# Patient Record
Sex: FEMALE | Race: NATIVE HAWAIIAN OR OTHER PACIFIC ISLANDER | HISPANIC OR LATINO | Employment: OTHER | URBAN - METROPOLITAN AREA
[De-identification: names, ages, dates, MRNs, and addresses within clinical notes are randomized per-mention and may not be internally consistent; named-entity substitution may affect disease eponyms.]

---

## 2024-10-30 ENCOUNTER — OFFICE VISIT (OUTPATIENT)
Dept: FAMILY MEDICINE CLINIC | Facility: CLINIC | Age: 84
End: 2024-10-30
Payer: MEDICARE

## 2024-10-30 VITALS
WEIGHT: 127.4 LBS | SYSTOLIC BLOOD PRESSURE: 136 MMHG | BODY MASS INDEX: 25.68 KG/M2 | TEMPERATURE: 97.9 F | DIASTOLIC BLOOD PRESSURE: 80 MMHG | HEART RATE: 58 BPM | HEIGHT: 59 IN | OXYGEN SATURATION: 97 %

## 2024-10-30 DIAGNOSIS — Z23 NEED FOR INFLUENZA VACCINATION: ICD-10-CM

## 2024-10-30 DIAGNOSIS — Z00.00 ENCOUNTER FOR SUBSEQUENT ANNUAL WELLNESS VISIT (AWV) IN MEDICARE PATIENT: Primary | ICD-10-CM

## 2024-10-30 DIAGNOSIS — M15.9 OSTEOARTHRITIS OF MULTIPLE JOINTS, UNSPECIFIED OSTEOARTHRITIS TYPE: ICD-10-CM

## 2024-10-30 DIAGNOSIS — E66.3 OVERWEIGHT WITH BODY MASS INDEX (BMI) OF 26 TO 26.9 IN ADULT: ICD-10-CM

## 2024-10-30 DIAGNOSIS — Z78.0 POST-MENOPAUSAL: ICD-10-CM

## 2024-10-30 DIAGNOSIS — R73.01 IFG (IMPAIRED FASTING GLUCOSE): ICD-10-CM

## 2024-10-30 DIAGNOSIS — Z13.29 THYROID DISORDER SCREENING: ICD-10-CM

## 2024-10-30 DIAGNOSIS — E55.9 VITAMIN D DEFICIENCY: ICD-10-CM

## 2024-10-30 DIAGNOSIS — E78.2 MIXED HYPERLIPIDEMIA: ICD-10-CM

## 2024-10-30 DIAGNOSIS — Z23 NEED FOR PNEUMOCOCCAL 20-VALENT CONJUGATE VACCINATION: ICD-10-CM

## 2024-10-30 DIAGNOSIS — I10 ESSENTIAL HYPERTENSION: ICD-10-CM

## 2024-10-30 LAB
25(OH)D3 SERPL-MCNC: 95.8 NG/ML (ref 30–100)
ALBUMIN SERPL-MCNC: 4.2 G/DL (ref 3.5–5.2)
ALBUMIN/GLOB SERPL: 1.3 G/DL
ALP SERPL-CCNC: 68 U/L (ref 39–117)
ALT SERPL W P-5'-P-CCNC: 25 U/L (ref 1–33)
ANION GAP SERPL CALCULATED.3IONS-SCNC: 9 MMOL/L (ref 5–15)
AST SERPL-CCNC: 18 U/L (ref 1–32)
BASOPHILS # BLD AUTO: 0.04 10*3/MM3 (ref 0–0.2)
BASOPHILS NFR BLD AUTO: 0.5 % (ref 0–1.5)
BILIRUB SERPL-MCNC: 0.4 MG/DL (ref 0–1.2)
BUN SERPL-MCNC: 18 MG/DL (ref 8–23)
BUN/CREAT SERPL: 15.3 (ref 7–25)
CALCIUM SPEC-SCNC: 10.1 MG/DL (ref 8.6–10.5)
CHLORIDE SERPL-SCNC: 99 MMOL/L (ref 98–107)
CHOLEST SERPL-MCNC: 160 MG/DL (ref 0–200)
CO2 SERPL-SCNC: 28 MMOL/L (ref 22–29)
CREAT SERPL-MCNC: 1.18 MG/DL (ref 0.57–1)
DEPRECATED RDW RBC AUTO: 41.6 FL (ref 37–54)
EGFRCR SERPLBLD CKD-EPI 2021: 45.6 ML/MIN/1.73
EOSINOPHIL # BLD AUTO: 0.18 10*3/MM3 (ref 0–0.4)
EOSINOPHIL NFR BLD AUTO: 2 % (ref 0.3–6.2)
ERYTHROCYTE [DISTWIDTH] IN BLOOD BY AUTOMATED COUNT: 12.7 % (ref 12.3–15.4)
GLOBULIN UR ELPH-MCNC: 3.2 GM/DL
GLUCOSE SERPL-MCNC: 87 MG/DL (ref 65–99)
HBA1C MFR BLD: 5.7 % (ref 4.8–5.6)
HCT VFR BLD AUTO: 38.2 % (ref 34–46.6)
HDLC SERPL-MCNC: 49 MG/DL (ref 40–60)
HGB BLD-MCNC: 12.2 G/DL (ref 12–15.9)
IMM GRANULOCYTES # BLD AUTO: 0.03 10*3/MM3 (ref 0–0.05)
IMM GRANULOCYTES NFR BLD AUTO: 0.3 % (ref 0–0.5)
LDLC SERPL CALC-MCNC: 93 MG/DL (ref 0–100)
LDLC/HDLC SERPL: 1.87 {RATIO}
LYMPHOCYTES # BLD AUTO: 1.62 10*3/MM3 (ref 0.7–3.1)
LYMPHOCYTES NFR BLD AUTO: 18.4 % (ref 19.6–45.3)
MCH RBC QN AUTO: 28 PG (ref 26.6–33)
MCHC RBC AUTO-ENTMCNC: 31.9 G/DL (ref 31.5–35.7)
MCV RBC AUTO: 87.6 FL (ref 79–97)
MONOCYTES # BLD AUTO: 0.89 10*3/MM3 (ref 0.1–0.9)
MONOCYTES NFR BLD AUTO: 10.1 % (ref 5–12)
NEUTROPHILS NFR BLD AUTO: 6.05 10*3/MM3 (ref 1.7–7)
NEUTROPHILS NFR BLD AUTO: 68.7 % (ref 42.7–76)
NRBC BLD AUTO-RTO: 0 /100 WBC (ref 0–0.2)
PLATELET # BLD AUTO: 286 10*3/MM3 (ref 140–450)
PMV BLD AUTO: 10.6 FL (ref 6–12)
POTASSIUM SERPL-SCNC: 4.3 MMOL/L (ref 3.5–5.2)
PROT SERPL-MCNC: 7.4 G/DL (ref 6–8.5)
RBC # BLD AUTO: 4.36 10*6/MM3 (ref 3.77–5.28)
SODIUM SERPL-SCNC: 136 MMOL/L (ref 136–145)
TRIGL SERPL-MCNC: 97 MG/DL (ref 0–150)
TSH SERPL DL<=0.05 MIU/L-ACNC: 2.37 UIU/ML (ref 0.27–4.2)
VLDLC SERPL-MCNC: 18 MG/DL (ref 5–40)
WBC NRBC COR # BLD AUTO: 8.81 10*3/MM3 (ref 3.4–10.8)

## 2024-10-30 PROCEDURE — 1126F AMNT PAIN NOTED NONE PRSNT: CPT | Performed by: NURSE PRACTITIONER

## 2024-10-30 PROCEDURE — 36415 COLL VENOUS BLD VENIPUNCTURE: CPT | Performed by: NURSE PRACTITIONER

## 2024-10-30 PROCEDURE — 80053 COMPREHEN METABOLIC PANEL: CPT | Performed by: NURSE PRACTITIONER

## 2024-10-30 PROCEDURE — G0008 ADMIN INFLUENZA VIRUS VAC: HCPCS | Performed by: NURSE PRACTITIONER

## 2024-10-30 PROCEDURE — G0009 ADMIN PNEUMOCOCCAL VACCINE: HCPCS | Performed by: NURSE PRACTITIONER

## 2024-10-30 PROCEDURE — 85025 COMPLETE CBC W/AUTO DIFF WBC: CPT | Performed by: NURSE PRACTITIONER

## 2024-10-30 PROCEDURE — 83036 HEMOGLOBIN GLYCOSYLATED A1C: CPT | Performed by: NURSE PRACTITIONER

## 2024-10-30 PROCEDURE — 1170F FXNL STATUS ASSESSED: CPT | Performed by: NURSE PRACTITIONER

## 2024-10-30 PROCEDURE — 1159F MED LIST DOCD IN RCRD: CPT | Performed by: NURSE PRACTITIONER

## 2024-10-30 PROCEDURE — G0439 PPPS, SUBSEQ VISIT: HCPCS | Performed by: NURSE PRACTITIONER

## 2024-10-30 PROCEDURE — 90677 PCV20 VACCINE IM: CPT | Performed by: NURSE PRACTITIONER

## 2024-10-30 PROCEDURE — 96160 PT-FOCUSED HLTH RISK ASSMT: CPT | Performed by: NURSE PRACTITIONER

## 2024-10-30 PROCEDURE — 90662 IIV NO PRSV INCREASED AG IM: CPT | Performed by: NURSE PRACTITIONER

## 2024-10-30 PROCEDURE — 82306 VITAMIN D 25 HYDROXY: CPT | Performed by: NURSE PRACTITIONER

## 2024-10-30 PROCEDURE — 80061 LIPID PANEL: CPT | Performed by: NURSE PRACTITIONER

## 2024-10-30 PROCEDURE — 84443 ASSAY THYROID STIM HORMONE: CPT | Performed by: NURSE PRACTITIONER

## 2024-10-30 PROCEDURE — 99214 OFFICE O/P EST MOD 30 MIN: CPT | Performed by: NURSE PRACTITIONER

## 2024-10-30 PROCEDURE — 1160F RVW MEDS BY RX/DR IN RCRD: CPT | Performed by: NURSE PRACTITIONER

## 2024-10-30 RX ORDER — HYDROCHLOROTHIAZIDE 50 MG/1
50 TABLET ORAL DAILY
Qty: 90 TABLET | Refills: 1 | Status: SHIPPED | OUTPATIENT
Start: 2024-10-30

## 2024-10-30 RX ORDER — HYDROCHLOROTHIAZIDE 50 MG/1
TABLET ORAL
COMMUNITY
Start: 2024-09-05 | End: 2024-10-30 | Stop reason: SDUPTHER

## 2024-10-30 RX ORDER — SIMVASTATIN 10 MG
10 TABLET ORAL NIGHTLY
Qty: 90 TABLET | Refills: 1 | Status: SHIPPED | OUTPATIENT
Start: 2024-10-30

## 2024-10-30 RX ORDER — PHENOL 1.4 %
600 AEROSOL, SPRAY (ML) MUCOUS MEMBRANE DAILY
COMMUNITY

## 2024-10-30 RX ORDER — MULTIVIT WITH MINERALS/LUTEIN
1000 TABLET ORAL DAILY
COMMUNITY

## 2024-10-30 RX ORDER — METOPROLOL TARTRATE 50 MG
50 TABLET ORAL 2 TIMES DAILY
Qty: 180 TABLET | Refills: 1 | Status: SHIPPED | OUTPATIENT
Start: 2024-10-30

## 2024-10-30 RX ORDER — LOSARTAN POTASSIUM 100 MG/1
100 TABLET ORAL DAILY
Qty: 90 TABLET | Refills: 1 | Status: SHIPPED | OUTPATIENT
Start: 2024-10-30

## 2024-10-30 RX ORDER — METOPROLOL TARTRATE 50 MG
TABLET ORAL
COMMUNITY
Start: 2024-10-04 | End: 2024-10-30 | Stop reason: SDUPTHER

## 2024-10-30 RX ORDER — MULTIVIT-MINERALS/FOLIC ACID 80 MCG
TABLET,CHEWABLE ORAL
COMMUNITY

## 2024-10-30 RX ORDER — SIMVASTATIN 10 MG
TABLET ORAL
COMMUNITY
Start: 2024-09-27 | End: 2024-10-30 | Stop reason: SDUPTHER

## 2024-10-30 RX ORDER — ACETAMINOPHEN 500 MG
500 TABLET ORAL EVERY 6 HOURS PRN
COMMUNITY

## 2024-10-30 RX ORDER — LOSARTAN POTASSIUM 100 MG/1
TABLET ORAL
COMMUNITY
Start: 2024-08-29 | End: 2024-10-30 | Stop reason: SDUPTHER

## 2024-10-30 RX ORDER — BIOTIN 1000 MCG
TABLET,CHEWABLE ORAL
COMMUNITY

## 2024-10-30 RX ORDER — NAPROXEN 500 MG/1
500 TABLET ORAL 2 TIMES DAILY WITH MEALS
COMMUNITY

## 2024-10-30 RX ORDER — AMOXICILLIN 500 MG
1200 CAPSULE ORAL DAILY
COMMUNITY

## 2024-10-30 RX ORDER — MAGNESIUM OXIDE 400 MG/1
400 TABLET ORAL DAILY
COMMUNITY

## 2024-10-30 NOTE — PROGRESS NOTES
Subjective   The ABCs of the Annual Wellness Visit  Medicare Wellness Visit      Jacquelin Mckenna is a 84 y.o. patient who presents for a Medicare Wellness Visit.    The following portions of the patient's history were reviewed and   updated as appropriate: allergies, current medications, past family history, past medical history, past social history, past surgical history, and problem list.    Compared to one year ago, the patient's physical   health is the same.  Compared to one year ago, the patient's mental   health is the same.    Recent Hospitalizations:  She was not admitted to the hospital during the last year.     Current Medical Providers:  Patient Care Team:  Neva Bernal APRN as PCP - General (Nurse Practitioner)    Outpatient Medications Prior to Visit   Medication Sig Dispense Refill    acetaminophen (TYLENOL) 500 MG tablet Take 1 tablet by mouth Every 6 (Six) Hours As Needed for Mild Pain.      Biotin 1000 MCG chewable tablet Chew.      calcium carbonate (OS-QUINTEN) 600 MG tablet Take 1 tablet by mouth Daily.      cholecalciferol (VITAMIN D3) 1.25 MG (75232 UT) capsule Take 1 capsule by mouth Every 7 (Seven) Days.      magnesium oxide (MAG-OX) 400 MG tablet Take 1 tablet by mouth Daily.      Multiple Vitamins-Minerals (Centrum Adult 50+ MultiGummies) chewable tablet Chew.      naproxen (NAPROSYN) 500 MG tablet Take 1 tablet by mouth 2 (Two) Times a Day With Meals.      Omega-3 Fatty Acids (fish oil) 1200 MG capsule capsule Take 1 capsule by mouth Daily.      VITAMIN E 1000 UNIT capsule Take 1 capsule by mouth Daily.      hydroCHLOROthiazide 50 MG tablet       losartan (COZAAR) 100 MG tablet       metoprolol tartrate (LOPRESSOR) 50 MG tablet       omeprazole (priLOSEC) 20 MG capsule       simvastatin (ZOCOR) 10 MG tablet        No facility-administered medications prior to visit.     No opioid medication identified on active medication list. I have reviewed chart for other potential  high risk  "medication/s and harmful drug interactions in the elderly.      Aspirin is not on active medication list.  Aspirin use is not indicated based on review of current medical condition/s. Risk of harm outweighs potential benefits.  .    There is no problem list on file for this patient.    Advance Care Planning Advance Directive is not on file.  ACP discussion was held with the patient during this visit. Patient does not have an advance directive, information provided.            Objective   Vitals:    10/30/24 0904   BP: 136/80   Pulse: 58   Temp: 97.9 °F (36.6 °C)   SpO2: 97%   Weight: 57.8 kg (127 lb 6.4 oz)   Height: 148.6 cm (58.5\")   PainSc: 0-No pain       Estimated body mass index is 26.17 kg/m² as calculated from the following:    Height as of this encounter: 148.6 cm (58.5\").    Weight as of this encounter: 57.8 kg (127 lb 6.4 oz).    BMI is >= 25 and <30. (Overweight) The following options were offered after discussion;: weight loss educational material (shared in after visit summary)       Does the patient have evidence of cognitive impairment? No                                                                                                Health  Risk Assessment    Smoking Status:  Social History     Tobacco Use   Smoking Status Never   Smokeless Tobacco Never     Alcohol Consumption:  Social History     Substance and Sexual Activity   Alcohol Use Never       Fall Risk Screen  STEADI Fall Risk Assessment was completed, and patient is at LOW risk for falls.Assessment completed on:10/30/2024    Depression Screening:      10/30/2024     9:13 AM   PHQ-2/PHQ-9 Depression Screening   Little interest or pleasure in doing things Not at all   Feeling down, depressed, or hopeless Not at all     Health Habits and Functional and Cognitive Screening:      10/30/2024     9:00 AM   Functional & Cognitive Status   Do you have difficulty preparing food and eating? No   Do you have difficulty bathing yourself, getting " dressed or grooming yourself? No   Do you have difficulty using the toilet? No   Do you have difficulty moving around from place to place? No   Do you have trouble with steps or getting out of a bed or a chair? No   Current Diet Well Balanced Diet   Dental Exam Up to date   Eye Exam Unknown   Exercise (times per week) 1 times per week   Do you need help using the phone?  No   Are you deaf or do you have serious difficulty hearing?  No   Do you need help to go to places out of walking distance? No   Do you need help shopping? Yes   Do you need help preparing meals?  Yes   Do you need help with housework?  Yes   Do you need help with laundry? Yes   Do you need help taking your medications? No   Do you need help managing money? No   Do you ever drive or ride in a car without wearing a seat belt? No   Have you felt unusual stress, anger or loneliness in the last month? No   Who do you live with? Child   If you need help, do you have trouble finding someone available to you? No   Have you been bothered in the last four weeks by sexual problems? No   Do you have difficulty concentrating, remembering or making decisions? No           Age-appropriate Screening Schedule:  Refer to the list below for future screening recommendations based on patient's age, sex and/or medical conditions. Orders for these recommended tests are listed in the plan section. The patient has been provided with a written plan.    Health Maintenance List  Health Maintenance   Topic Date Due    LIPID PANEL  Never done    DXA SCAN  Never done    BMI FOLLOWUP  Never done    ZOSTER VACCINE (1 of 2) 10/30/2024 (Originally [birth year]1990)    COVID-19 Vaccine (1 - 2023-24 season) 01/19/2025 (Originally 9/1/2024)    RSV Vaccine - Adults (1 - 1-dose 75+ series) 10/30/2025 (Originally 5/7/2015)    TDAP/TD VACCINES (1 - Tdap) 10/30/2025 (Originally 5/7/1959)    ANNUAL WELLNESS VISIT  10/30/2025    INFLUENZA VACCINE  Completed    Pneumococcal Vaccine 65+  Completed                                                                                                                                                 CMS Preventative Services Quick Reference  Risk Factors Identified During Encounter  None Identified    The above risks/problems have been discussed with the patient.  Pertinent information has been shared with the patient in the After Visit Summary.  An After Visit Summary and PPPS were made available to the patient.    Follow Up:   Next Medicare Wellness visit to be scheduled in 1 year.          Additional E&M Note during same encounter follows:  Patient has multiple medical problems which are significant and separately identifiable that require additional work above and beyond the Medicare Wellness Visit.      Chief Complaint  Establish Care    History of Present Illness  The patient presents to establish care. She is accompanied by an adult female she comes in today as a new patient recently moved to the area from [place]..    She reports no significant health concerns at this time. Her current medications include metoprolol, losartan, and hydrochlorothiazide for blood pressure, simvastatin for hyperlipidemia, and over-the-counter fish oil and vitamins. She also takes omeprazole. She has not had any recent lab work done and has not been hospitalized recently. She does not have an advanced directive or living will.    She experiences occasional forgetfulness, which she attributes to aging. She had a fall last year resulting in a bruise on her head but did not lose consciousness. She is independent in her daily activities, including cooking, cleaning, and personal hygiene. She occasionally uses a cane for support when walking outdoors. Her diet is generally well-balanced. She has not had an eye exam recently and reports no hearing issues. She no longer drives and uses an alarm to remind her to take her medications.    She experiences occasional knee pain, for which she  "takes medication as needed. She has tried over-the-counter arthritis cream with some relief. She has had shingles twice but has not received the vaccine. She does not experience any chronic back pain.    FAMILY HISTORY  Her grandmother had diabetes.      Jacquelin Mckenna is a 84 y.o. female who presents to [facility]       Objective   Vital Signs:   Vitals:    10/30/24 0904   BP: 136/80   Pulse: 58   Temp: 97.9 °F (36.6 °C)   SpO2: 97%   Weight: 57.8 kg (127 lb 6.4 oz)   Height: 148.6 cm (58.5\")   PainSc: 0-No pain       Wt Readings from Last 3 Encounters:   10/30/24 57.8 kg (127 lb 6.4 oz)     BP Readings from Last 3 Encounters:   10/30/24 136/80     BMI is >= 25 and <30. (Overweight) The following options were offered after discussion;: weight loss educational material (shared in after visit summary)   Physical Exam  Vitals reviewed.   Constitutional:       General: She is not in acute distress.     Appearance: Normal appearance. She is well-developed. She is not ill-appearing.      Comments: Overweight BMI 26   HENT:      Head: Normocephalic and atraumatic.   Eyes:      Conjunctiva/sclera: Conjunctivae normal.      Pupils: Pupils are equal, round, and reactive to light.   Cardiovascular:      Rate and Rhythm: Normal rate and regular rhythm.      Heart sounds: No murmur heard.  Pulmonary:      Effort: Pulmonary effort is normal.      Breath sounds: Normal breath sounds. No wheezing.   Musculoskeletal:      Right lower leg: No edema.      Left lower leg: No edema.   Skin:     General: Skin is warm and dry.   Neurological:      Mental Status: She is alert and oriented to person, place, and time.   Psychiatric:         Mood and Affect: Mood and affect normal.         Behavior: Behavior normal.         Thought Content: Thought content normal.         Judgment: Judgment normal.         The following data was reviewed by CORDELIA Davis on 10/30/2024  Common Labs "       Assessment & Plan   Diagnoses and all orders for this visit:    1. Encounter for subsequent annual wellness visit (AWV) in Medicare patient (Primary)    2. Vitamin D deficiency  -     Vitamin D,25-Hydroxy    3. Mixed hyperlipidemia  -     CBC & Differential  -     Comprehensive Metabolic Panel  -     Lipid Panel    4. Essential hypertension  -     CBC & Differential  -     Comprehensive Metabolic Panel    5. Need for influenza vaccination    6. Need for pneumococcal 20-valent conjugate vaccination    7. Overweight with body mass index (BMI) of 26 to 26.9 in adult    8. Post-menopausal  -     DEXA Bone Density Axial; Future    9. Thyroid disorder screening  -     TSH    10. Osteoarthritis of multiple joints, unspecified osteoarthritis type    11. IFG (impaired fasting glucose)  -     Hemoglobin A1c    Other orders  -     Fluzone High-Dose 65+yrs (4169-8146)  -     Pneumococcal Conjugate Vaccine 20-Valent (PCV20)  -     hydroCHLOROthiazide 50 MG tablet; Take 1 tablet by mouth Daily.  Dispense: 90 tablet; Refill: 1  -     losartan (COZAAR) 100 MG tablet; Take 1 tablet by mouth Daily.  Dispense: 90 tablet; Refill: 1  -     metoprolol tartrate (LOPRESSOR) 50 MG tablet; Take 1 tablet by mouth 2 (Two) Times a Day.  Dispense: 180 tablet; Refill: 1  -     omeprazole (priLOSEC) 20 MG capsule; Take 1 capsule by mouth Daily.  Dispense: 90 capsule; Refill: 1  -     simvastatin (ZOCOR) 10 MG tablet; Take 1 tablet by mouth Every Night.  Dispense: 90 tablet; Refill: 1  -     Diclofenac Sodium (VOLTAREN) 1 % gel gel; Apply 4 g topically to the appropriate area as directed 4 (Four) Times a Day As Needed (joint pain).  Dispense: 100 g; Refill: 3        Assessment & Plan  1. Hypertension.  Blood pressure readings are within the normal range. Continue current blood pressure medications. Prescriptions were renewed and sent to [facility] pharmacy.    2. Hyperlipidemia.  Prescriptions for cholesterol medications were renewed and  sent to [facility] pharmacy.    3. Gastroesophageal Reflux Disease (GERD).  Prescription for omeprazole was renewed and sent to [facility] pharmacy.    4. Osteoporosis Screening.  A DEXA scan was ordered to screen for bone loss, as it has been a long time since the last screening.  Will order diclofenac gel to help with joint pain to use as needed.    5. Arthritis.  Arthritis cream was prescribed for application as needed, up to 3 to 4 times daily. She was advised to use a cane for outdoor mobility to prevent falls.    6. Health Maintenance.  Pneumonia and influenza vaccines were administered during this visit. She was advised to receive the shingles vaccine at a pharmacy in a month. Blood work was ordered and she will be notified of the results.    Follow-up  Return in 6 months for follow up, or sooner if necessary.      BMI is >= 25 and <30. (Overweight) The following options were offered after discussion;: weight loss educational material (shared in after visit summary)       FOLLOW UP  Return in about 6 months (around 4/30/2025) for Recheck.  Patient was given instructions and counseling regarding her condition or for health maintenance advice. Please see specific information pulled into the AVS if appropriate.     CORDELIA Davis  10/30/24  11:54 EDT    Patient or patient representative verbalized consent for the use of Ambient Listening during the visit with  CORDELIA Davis for chart documentation. 10/30/2024  09:28 EDT

## 2024-11-06 ENCOUNTER — HOSPITAL ENCOUNTER (EMERGENCY)
Facility: HOSPITAL | Age: 84
Discharge: HOME | End: 2024-11-06
Attending: EMERGENCY MEDICINE | Admitting: EMERGENCY MEDICINE
Payer: MEDICARE

## 2024-11-06 ENCOUNTER — APPOINTMENT (OUTPATIENT)
Dept: CT IMAGING | Facility: HOSPITAL | Age: 84
End: 2024-11-06
Payer: MEDICARE

## 2024-11-06 VITALS
WEIGHT: 128.3 LBS | DIASTOLIC BLOOD PRESSURE: 60 MMHG | OXYGEN SATURATION: 100 % | BODY MASS INDEX: 25.87 KG/M2 | HEIGHT: 59 IN | RESPIRATION RATE: 20 BRPM | TEMPERATURE: 98.3 F | HEART RATE: 59 BPM | SYSTOLIC BLOOD PRESSURE: 169 MMHG

## 2024-11-06 DIAGNOSIS — S00.03XA HEMATOMA OF SCALP, INITIAL ENCOUNTER: Primary | ICD-10-CM

## 2024-11-06 PROCEDURE — 70450 CT HEAD/BRAIN W/O DYE: CPT

## 2024-11-06 PROCEDURE — 99284 EMERGENCY DEPT VISIT MOD MDM: CPT

## 2024-11-06 RX ORDER — TRANEXAMIC ACID 100 MG/ML
500 INJECTION, SOLUTION INTRAVENOUS ONCE
Status: COMPLETED | OUTPATIENT
Start: 2024-11-06 | End: 2024-11-06

## 2024-11-06 RX ADMIN — TRANEXAMIC ACID 500 MG: 100 INJECTION, SOLUTION INTRAVENOUS at 14:17

## 2024-11-06 NOTE — DISCHARGE INSTRUCTIONS
Leave bandage over the hematoma today.  You may have swelling in this area for 2 to 3 weeks until the hematoma absorbs.  Return for new or worsening symptoms.

## 2024-11-06 NOTE — ED PROVIDER NOTES
Time: 1:09 PM EST  Date of encounter:  11/6/2024  Independent Historian/Clinical History and Information was obtained by:   Patient and Family    History is limited by: N/A    Chief Complaint: Fall      History of Present Illness:  Patient is a 84 y.o. year old female who presents to the emergency department for evaluation of fall.  Patient presents to the emergency department today after a fall.  Patient was helping her daughter move boxes when she accidentally fell backwards and impacted her head on the concrete.  Patient denies any dizziness or weakness prior to the fall.  Patient do not have any syncope.  Patient did state after the fall she had some dizziness but that has resolved.  Patient denies any pain other than the posterior side of her head where she has swelling.  Patient was seen urgent care and sent here for further evaluation.  Patient is not on any blood thinners.      Patient Care Team  Primary Care Provider: Neva Bernal APRN    Past Medical History:     No Known Allergies  Past Medical History:   Diagnosis Date    Arthritis     GERD (gastroesophageal reflux disease)     Hyperlipidemia     Hypertension      Past Surgical History:   Procedure Laterality Date    BREAST SURGERY Left 02/27/2004     History reviewed. No pertinent family history.    Home Medications:  Prior to Admission medications    Medication Sig Start Date End Date Taking? Authorizing Provider   acetaminophen (TYLENOL) 500 MG tablet Take 1 tablet by mouth Every 6 (Six) Hours As Needed for Mild Pain.    ProviderPritesh MD   Biotin 1000 MCG chewable tablet Chew.    Pritesh Winters MD   calcium carbonate (OS-QUINTEN) 600 MG tablet Take 1 tablet by mouth Daily.    Pritesh Winters MD   cholecalciferol (VITAMIN D3) 1.25 MG (14578 UT) capsule Take 1 capsule by mouth Every 7 (Seven) Days.    Pritesh Winters MD   Diclofenac Sodium (VOLTAREN) 1 % gel gel Apply 4 g topically to the appropriate area as directed 4  "(Four) Times a Day As Needed (joint pain). 10/30/24   Neva Bernal APRN   hydroCHLOROthiazide 50 MG tablet Take 1 tablet by mouth Daily. 10/30/24   Neva Bernal APRN   losartan (COZAAR) 100 MG tablet Take 1 tablet by mouth Daily. 10/30/24   Neva Bernal APRN   magnesium oxide (MAG-OX) 400 MG tablet Take 1 tablet by mouth Daily.    Pritesh Winters MD   metoprolol tartrate (LOPRESSOR) 50 MG tablet Take 1 tablet by mouth 2 (Two) Times a Day. 10/30/24   Neva Bernal APRN   Multiple Vitamins-Minerals (Centrum Adult 50+ MultiGummies) chewable tablet Chew.    Pritesh Winters MD   naproxen (NAPROSYN) 500 MG tablet Take 1 tablet by mouth 2 (Two) Times a Day With Meals.    Pritesh Winters MD   Omega-3 Fatty Acids (fish oil) 1200 MG capsule capsule Take 1 capsule by mouth Daily.    Pritesh Winters MD   omeprazole (priLOSEC) 20 MG capsule Take 1 capsule by mouth Daily. 10/30/24   Neva Bernal APRN   simvastatin (ZOCOR) 10 MG tablet Take 1 tablet by mouth Every Night. 10/30/24   Neva Bernal APRN   VITAMIN E 1000 UNIT capsule Take 1 capsule by mouth Daily.    Pritesh Winters MD        Social History:   Social History     Tobacco Use    Smoking status: Never    Smokeless tobacco: Never   Vaping Use    Vaping status: Never Used   Substance Use Topics    Alcohol use: Never    Drug use: Never         Review of Systems:  Review of Systems   Musculoskeletal:  Negative for neck pain.   Neurological:  Positive for dizziness and headaches. Negative for syncope.        Physical Exam:  /60 (BP Location: Left arm, Patient Position: Sitting)   Pulse 59   Temp 98.3 °F (36.8 °C)   Resp 20   Ht 149.9 cm (59\")   Wt 58.2 kg (128 lb 4.8 oz)   SpO2 100%   BMI 25.91 kg/m²     Physical Exam  Vitals and nursing note reviewed.   Constitutional:       Appearance: Normal appearance.   HENT:      Head: Normocephalic.        Nose: Nose normal.   Eyes:     "  Conjunctiva/sclera: Conjunctivae normal.   Cardiovascular:      Rate and Rhythm: Normal rate and regular rhythm.      Heart sounds: Normal heart sounds.   Pulmonary:      Effort: Pulmonary effort is normal.      Breath sounds: Normal breath sounds.   Musculoskeletal:         General: Normal range of motion.      Cervical back: Normal range of motion and neck supple. No tenderness.   Skin:     General: Skin is warm and dry.   Neurological:      General: No focal deficit present.      Mental Status: She is alert and oriented to person, place, and time.   Psychiatric:         Mood and Affect: Mood normal.         Behavior: Behavior normal.         Thought Content: Thought content normal.         Judgment: Judgment normal.                Procedures:  Wound Care    Date/Time: 11/6/2024 3:02 PM    Performed by: Arnie Villalobos PA-C  Authorized by: Sahil Bowie MD    Consent:     Consent obtained:  Verbal    Consent given by:  Patient    Risks, benefits, and alternatives were discussed: yes      Risks discussed:  Bleeding and pain  Universal protocol:     Patient identity confirmed:  Verbally with patient  Procedure details:     Indications comment:  Hematoma    Wound location:  Scalp    Scalp location:  Occipital  Skin layer closed with:     Wound care performed: TXA for hemostasis.  Dressing:     Dressing applied:  4x4    Wrapped with:  Bulky dressing  Post-procedure details:     Procedure completion:  Tolerated well, no immediate complications        Medical Decision Making:    Comorbidities that affect care:    Hypertension, hyperlipidemia, GERD    External Notes reviewed:    Previous Clinic Note: Urgent care visit from today where patient was seen for same complaint and referred to the emergency department      The following orders were placed and all results were independently analyzed by me:  Orders Placed This Encounter   Procedures    Wound Care    CT Head Without Contrast    Clean head injury  Misc  Nursing Order (Specify)    Place bandage over hematoma  Misc Nursing Order (Specify)       Medications Given in the Emergency Department:  Medications   tranexamic acid 500 MG/5ML (ED/Crit Care for lacerations) - VIAL DISPENSE 500 mg (500 mg Topical Given 11/6/24 1417)        ED Course:    ED Course as of 11/06/24 1503   Wed Nov 06, 2024   1459 Bleeding from hematoma has stopped on reevaluation [MD]      ED Course User Index  [MD] Arnie Villalobos PA-C       Labs:    Lab Results (last 24 hours)       ** No results found for the last 24 hours. **             Imaging:    CT Head Without Contrast    Result Date: 11/6/2024  CT HEAD WO CONTRAST Date of Exam: 11/6/2024 2:33 PM EST Indication: Fall with head injury. Comparison: None available. Technique: Axial CT images were obtained of the head without contrast administration.  Reconstructed coronal and sagittal images were also obtained. Automated exposure control and iterative construction methods were used. Findings: There is no evidence of acute intracranial hemorrhage, mass, midline shift, loss of gray-white matter differentiation, or extra-axial fluid collection. There is subcortical and periventricular white matter hypodensities which likely reflect sequela of chronic small vessel ischemic disease. There is global parenchymal volume loss with ex vacuo dilation of the ventricular system. Basal cisterns are patent. There is no acute fracture or suspicious bone lesion. Paranasal sinuses and mastoid air cells are well aerated. Bilateral orbits are unremarkable. There is a large subcutaneous hematoma along the right posterior scalp extending to the cutaneous surface. There is also mild asymmetric subcutaneous edema along the right forehead.     Impression: Large subcutaneous hematoma along the right posterior scalp extending to the cutaneous surface. No evidence of acute fracture. No acute intracranial abnormality. Electronically Signed: Bret Oneil MD  11/6/2024  2:52 PM EST  Workstation ID: [id number]       Differential Diagnosis and Discussion:    Headache: Differential diagnosis includes but is not limited to migraine, cluster headache, hypertension, tumor, subarachnoid bleeding, pseudotumor cerebri, temporal arteritis, infections, tension headache, and TMJ syndrome.    CT scan radiology impression was interpreted by me.    MDM  Number of Diagnoses or Management Options  Hematoma of scalp, initial encounter  Diagnosis management comments: Patient presented to the emergency department today for evaluation of a head injury.  There was hematoma noted on physical exam the did have some mild bleeding.  TXA was applied to the hematoma and bleeding resolved.  CT head was completed and had no findings other than the scalp hematoma.  Patient given return to the emergency department guidelines.       Amount and/or Complexity of Data Reviewed  Tests in the radiology section of CPT®: reviewed and ordered    Risk of Complications, Morbidity, and/or Mortality  Presenting problems: moderate  Diagnostic procedures: moderate  Management options: low    Patient Progress  Patient progress: stable       Patient Care Considerations:    ANTIBIOTICS: I considered prescribing antibiotics as an outpatient however no bacterial focus of infection was found.      Consultants/Shared Management Plan:    None    Social Determinants of Health:    Patient is independent, reliable, and has access to care.       Disposition and Care Coordination:    Discharged: The patient is suitable and stable for discharge with no need for consideration of admission.    I have explained the patient´s condition, diagnoses and treatment plan based on the information available to me at this time. I have answered questions and addressed any concerns. The patient has a good  understanding of the patient´s diagnosis, condition, and treatment plan as can be expected at this point. The vital signs have been stable. The patient´s  condition is stable and appropriate for discharge from the emergency department.      The patient will pursue further outpatient evaluation with the primary care physician or other designated or consulting physician as outlined in the discharge instructions. They are agreeable to this plan of care and follow-up instructions have been explained in detail. The patient has received these instructions in written format and has expressed an understanding of the discharge instructions. The patient is aware that any significant change in condition or worsening of symptoms should prompt an immediate return to this or the closest emergency department or call to 911.  I have explained discharge medications and the need for follow up with the patient/caretakers. This was also printed in the discharge instructions. Patient was discharged with the following medications and follow up:      Medication List      No changes were made to your prescriptions during this visit.      Neva Bernal, APRN  [address] [address]  [phone]             Final diagnoses:   Hematoma of scalp, initial encounter        ED Disposition       ED Disposition   Discharge    Condition   Stable    Comment   --               This medical record created using voice recognition software.             Arnie Villalobos PA-C  11/06/24 1508

## 2024-11-08 ENCOUNTER — TELEPHONE (OUTPATIENT)
Dept: FAMILY MEDICINE CLINIC | Facility: CLINIC | Age: 84
End: 2024-11-08
Payer: MEDICARE

## 2025-04-28 RX ORDER — METOPROLOL TARTRATE 50 MG
50 TABLET ORAL 2 TIMES DAILY
Qty: 180 TABLET | Refills: 0 | Status: SHIPPED | OUTPATIENT
Start: 2025-04-28

## 2025-07-02 RX ORDER — HYDROCHLOROTHIAZIDE 50 MG/1
50 TABLET ORAL DAILY
Qty: 90 TABLET | Refills: 0 | Status: SHIPPED | OUTPATIENT
Start: 2025-07-02

## 2025-07-11 RX ORDER — METOPROLOL TARTRATE 50 MG
50 TABLET ORAL 2 TIMES DAILY
Qty: 180 TABLET | Refills: 0 | Status: SHIPPED | OUTPATIENT
Start: 2025-07-11

## 2025-07-11 RX ORDER — OMEPRAZOLE 20 MG/1
20 CAPSULE, DELAYED RELEASE ORAL DAILY
Qty: 90 CAPSULE | Refills: 0 | Status: SHIPPED | OUTPATIENT
Start: 2025-07-11